# Patient Record
Sex: FEMALE | Race: WHITE | Employment: FULL TIME | URBAN - METROPOLITAN AREA
[De-identification: names, ages, dates, MRNs, and addresses within clinical notes are randomized per-mention and may not be internally consistent; named-entity substitution may affect disease eponyms.]

---

## 2019-11-01 ENCOUNTER — RECORDS - HEALTHEAST (OUTPATIENT)
Dept: LAB | Facility: CLINIC | Age: 54
End: 2019-11-01

## 2019-11-01 LAB
ALBUMIN SERPL-MCNC: 4.3 G/DL (ref 3.5–5)
ALP SERPL-CCNC: 115 U/L (ref 45–120)
ALT SERPL W P-5'-P-CCNC: 14 U/L (ref 0–45)
ANION GAP SERPL CALCULATED.3IONS-SCNC: 8 MMOL/L (ref 5–18)
AST SERPL W P-5'-P-CCNC: 17 U/L (ref 0–40)
BILIRUB SERPL-MCNC: 0.2 MG/DL (ref 0–1)
BUN SERPL-MCNC: 17 MG/DL (ref 8–22)
C REACTIVE PROTEIN LHE: 0.3 MG/DL (ref 0–0.8)
CALCIUM SERPL-MCNC: 9.9 MG/DL (ref 8.5–10.5)
CHLORIDE BLD-SCNC: 105 MMOL/L (ref 98–107)
CO2 SERPL-SCNC: 29 MMOL/L (ref 22–31)
CREAT SERPL-MCNC: 1.1 MG/DL (ref 0.6–1.1)
ERYTHROCYTE [DISTWIDTH] IN BLOOD BY AUTOMATED COUNT: 11.9 % (ref 11–14.5)
ERYTHROCYTE [SEDIMENTATION RATE] IN BLOOD BY WESTERGREN METHOD: 19 MM/HR (ref 0–20)
GFR SERPL CREATININE-BSD FRML MDRD: 52 ML/MIN/1.73M2
GLUCOSE BLD-MCNC: 92 MG/DL (ref 70–125)
HCT VFR BLD AUTO: 39.8 % (ref 35–47)
HGB BLD-MCNC: 13.1 G/DL (ref 12–16)
MCH RBC QN AUTO: 30.3 PG (ref 27–34)
MCHC RBC AUTO-ENTMCNC: 32.9 G/DL (ref 32–36)
MCV RBC AUTO: 92 FL (ref 80–100)
PLATELET # BLD AUTO: 324 THOU/UL (ref 140–440)
PMV BLD AUTO: 10.8 FL (ref 8.5–12.5)
POTASSIUM BLD-SCNC: 4.1 MMOL/L (ref 3.5–5)
PROT SERPL-MCNC: 7.1 G/DL (ref 6–8)
RBC # BLD AUTO: 4.32 MILL/UL (ref 3.8–5.4)
RHEUMATOID FACT SERPL-ACNC: <15 IU/ML (ref 0–30)
SODIUM SERPL-SCNC: 142 MMOL/L (ref 136–145)
T4 FREE SERPL-MCNC: 0.9 NG/DL (ref 0.7–1.8)
TSH SERPL DL<=0.005 MIU/L-ACNC: 1.42 UIU/ML (ref 0.3–5)
VIT B12 SERPL-MCNC: 860 PG/ML (ref 213–816)
WBC: 7.2 THOU/UL (ref 4–11)

## 2019-11-03 LAB — HBA1C MFR BLD: 5.8 % (ref 4.2–6.1)

## 2019-11-04 LAB
25(OH)D3 SERPL-MCNC: 21.2 NG/ML (ref 30–80)
A PHAGOCYTOPH DNA BLD QL NAA+PROBE: NOT DETECTED
ANA SER QL: 0.2 U
E CHAFFEENSIS DNA BLD QL NAA+PROBE: NOT DETECTED
E EWINGII DNA SPEC QL NAA+PROBE: NOT DETECTED
EHRLICHIA DNA SPEC QL NAA+PROBE: NOT DETECTED

## 2019-11-08 LAB
B BURGDOR AB SER-IMP: ABNORMAL
LYME AB IGG BAND(S): ABNORMAL
LYME AB IGM BAND(S): ABNORMAL
LYME IGG BLOT: NEGATIVE
LYME IGM BLOT: POSITIVE

## 2020-04-19 ENCOUNTER — VIRTUAL VISIT (OUTPATIENT)
Dept: FAMILY MEDICINE | Facility: OTHER | Age: 55
End: 2020-04-19

## 2020-04-20 NOTE — PROGRESS NOTES
"Date: 2020 22:43:36  Clinician: Kaia Pham  Clinician NPI: [id number]  Patient: Marta Jimenez  Patient : 1965  Patient Address: [address] [place] [address]  Patient Phone: [phone]  Visit Protocol: URI  Patient Summary:  Marta is a 54 year old ( : 1965 ) female who initiated a Visit for COVID-19 (Coronavirus) evaluation and screening. When asked the question \"Please sign me up to receive news, health information and promotions from [employer].\", Marta responded \"No\".    Marta states her symptoms started suddenly 3-4 days ago. After her symptoms started, they improved and then got worse again.   Her symptoms consist of chills, a sore throat, a cough, malaise, myalgia, and a headache. She is experiencing mild difficulty breathing with activities but can speak normally in full sentences. Marta also feels feverish.   Symptom details     Cough: Marta coughs a few times an hour and her cough is more bothersome at night. Phlegm does not come into her throat when she coughs. She does not believe her cough is caused by post-nasal drip.     Sore throat: Marta reports having moderate throat pain (4-6 on a 10 point pain scale), does not have exudate on her tonsils, and can swallow liquids. She is not sure if the lymph nodes in her neck are enlarged. A rash has not appeared on the skin since the sore throat started.     Temperature: Her current temperature is 100.4 degrees Fahrenheit. Marta has had a temperature over 100 degrees Fahrenheit for 3-4 days.     Headache: She states the headache is severe (7-9 on a 10 point pain scale).      Marta denies having rhinitis, diarrhea, facial pain or pressure, nasal congestion, ear pain, vomiting, nausea, teeth pain, wheezing, anosmia, and ageusia. She also denies having a sinus infection within the past year and having recent facial or sinus surgery in the past 60 days.   Precipitating events  Within the past week, Marta " has not been exposed to someone with strep throat. She has not recently been exposed to someone with influenza. Marta has been in close contact with the following high risk individuals: adults 65 or older, people with asthma, heart disease or diabetes, pregnant women, and immunocompromised people.   Pertinent COVID-19 (Coronavirus) information  Marta has not traveled internationally or to the areas where COVID-19 (Coronavirus) is widespread, including cruise ship travel in the last 14 days before the start of her symptoms.   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   Marta has had a close contact with a laboratory-confirmed COVID-19 patient within 14 days of symptom onset. She also has had a close contact with a suspected COVID-19 patient within 14 days of symptom onset. She was exposed at her work. Additional information about contact with COVID-19 (Coronavirus) patient as reported by the patient (free text): April 2, confirmed positive patients in OR. Several PUI since then.   Pertinent medical history  Marta has taken an antibiotic medication in the past month. Antibiotic details as reported by the patient (free text): Doxycycline for Lyme tick bite   Marta does not get yeast infections when she takes antibiotics.   Marta needs a return to work/school note.   Weight: 150 lbs   Marta does not smoke or use smokeless tobacco.   Additional information as reported by the patient (free text): Hx of asthma, heart condition, hypertension   Weight: 150 lbs    MEDICATIONS: aspirin oral, Wixela Inhub inhalation, albuterol sulfate inhalation, doxycycline hyclate oral, olmesartan-hydrochlorothiazide oral, ALLERGIES:  erythromycin base, Penicillins, Cleocin  Clinician Response:  Dear Marta,      Dear Marta  Your symptoms show that you may have coronavirus (COVID-19). This illness can cause fever, cough and trouble breathing. Many people get a mild case and get better on their own. Some people can get very sick.  Will I be tested for COVID-19?  Because the virus is spreading, we are no longer testing most patients. You may request testing if:   You are very ill. For example, you're on chemotherapy, dialysis or home hospice care. (Contact your specialty clinic or program.)   You live in a nursing home or other long-term care facility. (Talk to your nurse manager or medical director.)   You're a health care worker. (Contact your employee health office.)   How can I protect others?  Without a test, we can't know for sure that you have COVID-19. For safety, it's very important to follow these rules.  First, stay home and away from others (self-isolate) until:   You've had no fever---and no medicine that reduces fever---for 3 full days (72 hours). And...    Your other symptoms have gotten better. For example, your cough or breathing has improved. And...   At least 7 days have passed since your symptoms started.   During this time:   Don't go to work, school or anywhere else.    Stay away from others in your home. No hugging, kissing or shaking hands.   Don't let anyone visit.   Cover your mouth and nose with a mask, tissue or wash cloth to avoid spreading germs.   Wash your hands and face often. Use soap and water.   How can I take care of myself?   1.Take Tylenol (acetaminophen) for fever or pain. If you have liver or kidney problems, ask your family doctor if it's okay to take Tylenol.        Adults can take either:    650 mg (two 325 mg pills) every 4 to 6 hours, or...   1,000 mg (two 500 mg pills) every 8 hours as needed.    Note: Don't take more than 3,000 mg in one day.   For children, check the Tylenol bottle for the right  dose. The dose is based on the child's age or weight.   2.If you have other health problems (like cancer, heart failure, an organ transplant or severe kidney disease): Call your specialty clinic if you don't feel better in the next 2 days.       3.Know when to call 911: If your breathing is so bad that it keeps you from doing normal activities, call 911 or go to the emergency room. Tell them that you've been staying home and may have COVID-19.       4.Sign up for [employer]. We know it's scary to hear that you might have COVID-19. We want to track your symptoms to make sure you're okay over the next 2 weeks. Please look for an email from [employer]---this is a free, online program that we'll use to keep in touch. To sign up, follow the link in the email. Learn more at [email or web address].   Where can I get more information?  To learn more about COVID-19 and how to care for yourself at home, please visit the CDC website at https://www.cdc.gov/coronavirus/2019-ncov/about/steps-when-sick.html.  For more options for care at [facility], please visit our website at [email or web address]     Diagnosis: Cough  Diagnosis ICD: R05

## 2020-06-03 ENCOUNTER — OFFICE VISIT - HEALTHEAST (OUTPATIENT)
Dept: FAMILY MEDICINE | Facility: CLINIC | Age: 55
End: 2020-06-03

## 2020-06-03 ENCOUNTER — COMMUNICATION - HEALTHEAST (OUTPATIENT)
Dept: FAMILY MEDICINE | Facility: CLINIC | Age: 55
End: 2020-06-03

## 2020-06-03 DIAGNOSIS — M25.50 MULTIPLE JOINT PAIN: ICD-10-CM

## 2020-06-03 DIAGNOSIS — I20.89 SYNDROME X (CARDIAC) (H): ICD-10-CM

## 2020-06-03 DIAGNOSIS — R63.5 WEIGHT GAIN: ICD-10-CM

## 2020-06-03 DIAGNOSIS — R68.82 DECREASED LIBIDO: ICD-10-CM

## 2020-06-03 RX ORDER — OLMESARTAN MEDOXOMIL 40 MG/1
40 TABLET ORAL DAILY
Status: SHIPPED | COMMUNITY
Start: 2020-06-03

## 2020-06-03 RX ORDER — NITROGLYCERIN 0.4 MG/1
0.4 TABLET SUBLINGUAL
Status: SHIPPED | COMMUNITY
Start: 2019-05-01

## 2020-06-03 RX ORDER — ALBUTEROL SULFATE 90 UG/1
1-2 AEROSOL, METERED RESPIRATORY (INHALATION)
Status: SHIPPED | COMMUNITY
Start: 2020-06-03

## 2020-06-09 ENCOUNTER — AMBULATORY - HEALTHEAST (OUTPATIENT)
Dept: LAB | Facility: CLINIC | Age: 55
End: 2020-06-09

## 2020-06-09 DIAGNOSIS — M25.50 MULTIPLE JOINT PAIN: ICD-10-CM

## 2020-06-09 DIAGNOSIS — R63.5 WEIGHT GAIN: ICD-10-CM

## 2020-06-09 DIAGNOSIS — I20.89 SYNDROME X (CARDIAC) (H): ICD-10-CM

## 2020-06-09 DIAGNOSIS — R68.82 DECREASED LIBIDO: ICD-10-CM

## 2020-06-09 LAB
ALBUMIN SERPL-MCNC: 4 G/DL (ref 3.5–5)
ALP SERPL-CCNC: 113 U/L (ref 45–120)
ALT SERPL W P-5'-P-CCNC: 21 U/L (ref 0–45)
ANION GAP SERPL CALCULATED.3IONS-SCNC: 11 MMOL/L (ref 5–18)
AST SERPL W P-5'-P-CCNC: 27 U/L (ref 0–40)
BILIRUB SERPL-MCNC: 0.4 MG/DL (ref 0–1)
BUN SERPL-MCNC: 9 MG/DL (ref 8–22)
CALCIUM SERPL-MCNC: 9.4 MG/DL (ref 8.5–10.5)
CHLORIDE BLD-SCNC: 105 MMOL/L (ref 98–107)
CO2 SERPL-SCNC: 26 MMOL/L (ref 22–31)
CREAT SERPL-MCNC: 0.87 MG/DL (ref 0.6–1.1)
CRP SERPL HS-MCNC: 5.1 MG/L (ref 0–3)
ERYTHROCYTE [DISTWIDTH] IN BLOOD BY AUTOMATED COUNT: 11.9 % (ref 11–14.5)
ESTRADIOL SERPL-MCNC: <10 PG/ML
FASTING STATUS PATIENT QL REPORTED: NORMAL
FSH SERPL-ACNC: 70.2 MIU/ML
GFR SERPL CREATININE-BSD FRML MDRD: >60 ML/MIN/1.73M2
GGT SERPL-CCNC: 24 U/L (ref 0–50)
GLUCOSE BLD-MCNC: 96 MG/DL (ref 70–125)
HCT VFR BLD AUTO: 38.7 % (ref 35–47)
HGB BLD-MCNC: 13 G/DL (ref 12–16)
HOMOCYSTEINE PLASMA - HISTORICAL: 9 UMOL/L (ref 0–13)
MCH RBC QN AUTO: 30.5 PG (ref 27–34)
MCHC RBC AUTO-ENTMCNC: 33.6 G/DL (ref 32–36)
MCV RBC AUTO: 91 FL (ref 80–100)
PLATELET # BLD AUTO: 338 THOU/UL (ref 140–440)
PMV BLD AUTO: 7.6 FL (ref 7–10)
POTASSIUM BLD-SCNC: 4 MMOL/L (ref 3.5–5)
PROT SERPL-MCNC: 6.8 G/DL (ref 6–8)
RBC # BLD AUTO: 4.27 MILL/UL (ref 3.8–5.4)
SODIUM SERPL-SCNC: 142 MMOL/L (ref 136–145)
T3 SERPL-MCNC: 98 NG/DL (ref 45–175)
T3FREE SERPL-MCNC: 3.8 PG/ML (ref 1.9–3.9)
T4 FREE SERPL-MCNC: 0.9 NG/DL (ref 0.7–1.8)
THYROID PEROXIDASE ANTIBODIES - HISTORICAL: <3 IU/ML (ref 0–5.6)
TSH SERPL DL<=0.005 MIU/L-ACNC: 1.1 UIU/ML (ref 0.3–5)
VIT B12 SERPL-MCNC: 674 PG/ML (ref 213–816)
WBC: 6.1 THOU/UL (ref 4–11)

## 2020-06-10 LAB — PROGEST SERPL-MCNC: <0.1 NG/ML

## 2020-06-11 LAB
DHEA-S SERPL-MCNC: 116 UG/DL (ref 26–200)
LIPOPROTEIN (A) - HISTORICAL: 6 MG/DL
SHBG SERPL-SCNC: 27 NMOL/L (ref 30–135)
TESTOST FREE SERPL-MCNC: 0.36 NG/DL (ref 0.06–0.38)
TESTOST SERPL-MCNC: 18 NG/DL (ref 8–60)
THYROGLOB AB SERPL-ACNC: <0.9 IU/ML (ref 0–4)

## 2020-06-12 LAB
25(OH)D3 SERPL-MCNC: 23.6 NG/ML (ref 30–80)
GLIADIN IGA SER-ACNC: 1.5 U/ML
GLIADIN IGG SER-ACNC: 1.8 U/ML
IGA SERPL-MCNC: 120 MG/DL (ref 65–400)
MAGNESIUM,RBC - HISTORICAL: 5.8 MG/DL (ref 3.5–7.1)
SPECIMEN STATUS: NORMAL
T3REVERSE SERPL-MCNC: 12 NG/DL (ref 9–27)
TTG IGA SER-ACNC: 0.3 U/ML
TTG IGG SER-ACNC: <0.6 U/ML

## 2020-06-13 LAB
CHOLEST SERPL-MCNC: 212 MG/DL
HDL SERPL QN: 9.2 NM
HDL SERPL-SCNC: 34.7 UMOL/L
HDLC SERPL-MCNC: 61 MG/DL (ref 40–59)
HLD.LARGE SERPL-SCNC: 7.7 UMOL/L
LDL SERPL QN: 21.5 NM
LDL SERPL-SCNC: 1306 NMOL/L
LDL SMALL SERPL-SCNC: 327 NMOL/L
LDLC SERPL CALC-MCNC: 134 MG/DL
PATHOLOGY STUDY: ABNORMAL
TRIGL SERPL-MCNC: 87 MG/DL (ref 30–149)
VLDL LARGE SERPL-SCNC: 3.5 NMOL/L
VLDL SERPL QN: 47 NM

## 2020-06-29 ENCOUNTER — COMMUNICATION - HEALTHEAST (OUTPATIENT)
Dept: FAMILY MEDICINE | Facility: CLINIC | Age: 55
End: 2020-06-29

## 2020-07-14 ENCOUNTER — AMBULATORY - HEALTHEAST (OUTPATIENT)
Dept: FAMILY MEDICINE | Facility: CLINIC | Age: 55
End: 2020-07-14

## 2020-07-28 ENCOUNTER — AMBULATORY - HEALTHEAST (OUTPATIENT)
Dept: CARDIOLOGY | Facility: CLINIC | Age: 55
End: 2020-07-28

## 2020-07-30 ENCOUNTER — COMMUNICATION - HEALTHEAST (OUTPATIENT)
Dept: CARDIOLOGY | Facility: CLINIC | Age: 55
End: 2020-07-30

## 2020-07-31 ENCOUNTER — OFFICE VISIT - HEALTHEAST (OUTPATIENT)
Dept: CARDIOLOGY | Facility: CLINIC | Age: 55
End: 2020-07-31

## 2020-07-31 DIAGNOSIS — R06.09 DYSPNEA ON EXERTION: ICD-10-CM

## 2020-07-31 DIAGNOSIS — R07.9 CHEST PAIN: ICD-10-CM

## 2020-07-31 ASSESSMENT — MIFFLIN-ST. JEOR: SCORE: 1280.6

## 2020-08-03 LAB
ATRIAL RATE - MUSE: 87 BPM
DIASTOLIC BLOOD PRESSURE - MUSE: NORMAL
INTERPRETATION ECG - MUSE: NORMAL
P AXIS - MUSE: 66 DEGREES
PR INTERVAL - MUSE: 128 MS
QRS DURATION - MUSE: 80 MS
QT - MUSE: 370 MS
QTC - MUSE: 445 MS
R AXIS - MUSE: -7 DEGREES
SYSTOLIC BLOOD PRESSURE - MUSE: NORMAL
T AXIS - MUSE: 37 DEGREES
VENTRICULAR RATE- MUSE: 87 BPM

## 2020-08-04 ENCOUNTER — COMMUNICATION - HEALTHEAST (OUTPATIENT)
Dept: CARDIOLOGY | Facility: CLINIC | Age: 55
End: 2020-08-04

## 2020-08-20 ENCOUNTER — HOSPITAL ENCOUNTER (OUTPATIENT)
Dept: MRI IMAGING | Facility: HOSPITAL | Age: 55
Discharge: HOME | End: 2020-08-20
Attending: INTERNAL MEDICINE

## 2020-08-20 DIAGNOSIS — R06.09 DYSPNEA ON EXERTION: ICD-10-CM

## 2020-08-20 DIAGNOSIS — R07.9 CHEST PAIN: ICD-10-CM

## 2020-08-20 LAB
ATRIAL RATE - MUSE: 69 BPM
ATRIAL RATE - MUSE: 77 BPM
CREAT BLD-MCNC: 0.8 MG/DL (ref 0.6–1.1)
DIASTOLIC BLOOD PRESSURE - MUSE: NORMAL
DIASTOLIC BLOOD PRESSURE - MUSE: NORMAL
GFR SERPL CREATININE-BSD FRML MDRD: >60 ML/MIN/1.73M2
INTERPRETATION ECG - MUSE: NORMAL
INTERPRETATION ECG - MUSE: NORMAL
P AXIS - MUSE: 51 DEGREES
P AXIS - MUSE: 62 DEGREES
PR INTERVAL - MUSE: 130 MS
PR INTERVAL - MUSE: 131 MS
QRS DURATION - MUSE: 78 MS
QRS DURATION - MUSE: 80 MS
QT - MUSE: 376 MS
QT - MUSE: 410 MS
QTC - MUSE: 425 MS
QTC - MUSE: 439 MS
R AXIS - MUSE: -11 DEGREES
R AXIS - MUSE: -20 DEGREES
SYSTOLIC BLOOD PRESSURE - MUSE: NORMAL
SYSTOLIC BLOOD PRESSURE - MUSE: NORMAL
T AXIS - MUSE: 24 DEGREES
T AXIS - MUSE: 32 DEGREES
VENTRICULAR RATE- MUSE: 69 BPM
VENTRICULAR RATE- MUSE: 77 BPM

## 2020-08-20 ASSESSMENT — MIFFLIN-ST. JEOR: SCORE: 1280.6

## 2020-08-21 LAB
CCTA EJECTION FRACTION: 78 %
CCTA INTERVENTRICULAR SETPUM: 0.9 CM (ref 0.6–1.1)
CCTA LEFT INTERNAL DIMENSION IN SYSTOLE: 2.1 CM (ref 2.1–4)
CCTA LEFT VENTRICULAR INTERNAL DIMENSION IN DIASTOLE: 3.9 CM (ref 3.5–6)
CCTA LEFT VENTRICULAR MASS: 97.36 G
CCTA POSTERIOR WALL: 0.8 CM (ref 0.6–1.1)
MR CARDIAC LEFT VENTRIAL CARDIAC INDEX: 2.9 L/MIN/M2 (ref 1.75–3.8)
MR CARDIAC LEFT VENTRICAL CARDIAC OUTPUT: 5.1 L/MIN (ref 2.8–8.8)
MR CARDIAC LEFT VENTRICULAR DIASTOLIC VOLUME INDEX: 63.18 ML/M2 (ref 41–81)
MR CARDIAC LEFT VENTRICULAR MASS INDEX: 64.9 G/M2 (ref 63–95)
MR CARDIAC LEFT VENTRICULAR MASS: 113 G (ref 75–175)
MR CARDIAC LEFT VENTRICULAR STROKE VOLUME INDEX: 47.1 ML/M2 (ref 26–56)
MR CARDIAC LEFT VENTRICULAR SYSTOLIC VOLUME INDEX: 16.08 ML/M2 (ref 12–20)
MR EJECTION FRACTION: 74.55 %
MR HEIGHT: 1.61 M
MR LEFT VENTRICULAR DYSTOLIC VOLUMEN: 110 ML (ref 52–141)
MR LEFT VENTRICULAR STROKE VOLUMEN: 82 ML (ref 33–97)
MR LEFT VENTRICULAR SYSTOLIC VOLUME: 28 ML (ref 13–51)
MR WEIGHT: 69.9 KG

## 2020-09-03 ENCOUNTER — COMMUNICATION - HEALTHEAST (OUTPATIENT)
Dept: CARDIOLOGY | Facility: CLINIC | Age: 55
End: 2020-09-03

## 2020-09-04 ENCOUNTER — OFFICE VISIT - HEALTHEAST (OUTPATIENT)
Dept: CARDIOLOGY | Facility: CLINIC | Age: 55
End: 2020-09-04

## 2020-09-04 ENCOUNTER — AMBULATORY - HEALTHEAST (OUTPATIENT)
Dept: CARDIOLOGY | Facility: CLINIC | Age: 55
End: 2020-09-04

## 2020-09-04 DIAGNOSIS — R07.9 CHEST PAIN: ICD-10-CM

## 2020-09-04 DIAGNOSIS — I20.0 UNSTABLE ANGINA (H): ICD-10-CM

## 2020-09-04 DIAGNOSIS — Z11.59 ENCOUNTER FOR SCREENING FOR OTHER VIRAL DISEASES: ICD-10-CM

## 2020-09-04 RX ORDER — ISOSORBIDE MONONITRATE 60 MG/1
60 TABLET, EXTENDED RELEASE ORAL DAILY
Qty: 90 TABLET | Refills: 3 | Status: SHIPPED | OUTPATIENT
Start: 2020-09-04 | End: 2021-08-31

## 2020-09-04 ASSESSMENT — MIFFLIN-ST. JEOR: SCORE: 1253.38

## 2020-09-06 ENCOUNTER — AMBULATORY - HEALTHEAST (OUTPATIENT)
Dept: FAMILY MEDICINE | Facility: CLINIC | Age: 55
End: 2020-09-06

## 2020-09-06 DIAGNOSIS — Z11.59 ENCOUNTER FOR SCREENING FOR OTHER VIRAL DISEASES: ICD-10-CM

## 2020-09-08 ENCOUNTER — AMBULATORY - HEALTHEAST (OUTPATIENT)
Dept: CARDIOLOGY | Facility: CLINIC | Age: 55
End: 2020-09-08

## 2020-09-08 ENCOUNTER — SURGERY - HEALTHEAST (OUTPATIENT)
Dept: CARDIOLOGY | Facility: CLINIC | Age: 55
End: 2020-09-08

## 2020-09-08 ENCOUNTER — COMMUNICATION - HEALTHEAST (OUTPATIENT)
Dept: SCHEDULING | Facility: CLINIC | Age: 55
End: 2020-09-08

## 2020-09-08 DIAGNOSIS — I20.0 UNSTABLE ANGINA (H): ICD-10-CM

## 2020-09-09 ENCOUNTER — SURGERY - HEALTHEAST (OUTPATIENT)
Dept: CARDIOLOGY | Facility: CLINIC | Age: 55
End: 2020-09-09

## 2020-09-09 ASSESSMENT — MIFFLIN-ST. JEOR: SCORE: 1259.97

## 2020-09-18 ENCOUNTER — AMBULATORY - HEALTHEAST (OUTPATIENT)
Dept: CARDIOLOGY | Facility: CLINIC | Age: 55
End: 2020-09-18

## 2020-09-18 ENCOUNTER — RECORDS - HEALTHEAST (OUTPATIENT)
Dept: ADMINISTRATIVE | Facility: OTHER | Age: 55
End: 2020-09-18

## 2020-09-21 ENCOUNTER — OFFICE VISIT - HEALTHEAST (OUTPATIENT)
Dept: CARDIOLOGY | Facility: CLINIC | Age: 55
End: 2020-09-21

## 2020-09-21 DIAGNOSIS — E78.5 DYSLIPIDEMIA: ICD-10-CM

## 2020-09-21 RX ORDER — ROSUVASTATIN CALCIUM 5 MG/1
2.5 TABLET, COATED ORAL AT BEDTIME
Qty: 90 TABLET | Refills: 3 | Status: SHIPPED | OUTPATIENT
Start: 2020-09-21 | End: 2021-09-28

## 2020-09-28 DIAGNOSIS — Z11.59 SCREENING FOR VIRAL DISEASE: ICD-10-CM

## 2020-10-09 ENCOUNTER — RECORDS - HEALTHEAST (OUTPATIENT)
Dept: LAB | Facility: CLINIC | Age: 55
End: 2020-10-09

## 2020-10-09 LAB
ALBUMIN SERPL-MCNC: 4 G/DL (ref 3.5–5)
ALBUMIN SERPL-MCNC: 4 G/DL (ref 3.5–5)
ALP SERPL-CCNC: 128 U/L (ref 45–120)
ALP SERPL-CCNC: 128 U/L (ref 45–120)
ALT SERPL W P-5'-P-CCNC: 13 U/L (ref 0–45)
ALT SERPL W P-5'-P-CCNC: 13 U/L (ref 0–45)
ANION GAP SERPL CALCULATED.3IONS-SCNC: 8 MMOL/L (ref 5–18)
AST SERPL W P-5'-P-CCNC: 17 U/L (ref 0–40)
AST SERPL W P-5'-P-CCNC: 17 U/L (ref 0–40)
BASOPHILS # BLD AUTO: 0 THOU/UL (ref 0–0.2)
BASOPHILS NFR BLD AUTO: 1 % (ref 0–2)
BILIRUB DIRECT SERPL-MCNC: 0.1 MG/DL
BILIRUB SERPL-MCNC: 0.4 MG/DL (ref 0–1)
BILIRUB SERPL-MCNC: 0.4 MG/DL (ref 0–1)
BUN SERPL-MCNC: 13 MG/DL (ref 8–22)
C REACTIVE PROTEIN LHE: 0.4 MG/DL (ref 0–0.8)
CALCIUM SERPL-MCNC: 9.4 MG/DL (ref 8.5–10.5)
CHLORIDE BLD-SCNC: 103 MMOL/L (ref 98–107)
CO2 SERPL-SCNC: 27 MMOL/L (ref 22–31)
CREAT SERPL-MCNC: 0.83 MG/DL (ref 0.6–1.1)
EOSINOPHIL # BLD AUTO: 0.2 THOU/UL (ref 0–0.4)
EOSINOPHIL NFR BLD AUTO: 3 % (ref 0–6)
ERYTHROCYTE [DISTWIDTH] IN BLOOD BY AUTOMATED COUNT: 12.2 % (ref 11–14.5)
ERYTHROCYTE [SEDIMENTATION RATE] IN BLOOD BY WESTERGREN METHOD: 14 MM/HR (ref 0–20)
GFR SERPL CREATININE-BSD FRML MDRD: >60 ML/MIN/1.73M2
GLUCOSE BLD-MCNC: 90 MG/DL (ref 70–125)
HCT VFR BLD AUTO: 41 % (ref 35–47)
HGB BLD-MCNC: 13.2 G/DL (ref 12–16)
IMM GRANULOCYTES # BLD: 0 THOU/UL
IMM GRANULOCYTES NFR BLD: 0 %
LYMPHOCYTES # BLD AUTO: 2 THOU/UL (ref 0.8–4.4)
LYMPHOCYTES NFR BLD AUTO: 38 % (ref 20–40)
MCH RBC QN AUTO: 29.7 PG (ref 27–34)
MCHC RBC AUTO-ENTMCNC: 32.2 G/DL (ref 32–36)
MCV RBC AUTO: 92 FL (ref 80–100)
MONOCYTES # BLD AUTO: 0.5 THOU/UL (ref 0–0.9)
MONOCYTES NFR BLD AUTO: 9 % (ref 2–10)
NEUTROPHILS # BLD AUTO: 2.6 THOU/UL (ref 2–7.7)
NEUTROPHILS NFR BLD AUTO: 49 % (ref 50–70)
PLATELET # BLD AUTO: 311 THOU/UL (ref 140–440)
PMV BLD AUTO: 11.2 FL (ref 8.5–12.5)
POTASSIUM BLD-SCNC: 4.9 MMOL/L (ref 3.5–5)
PROT SERPL-MCNC: 6.3 G/DL (ref 6–8)
PROT SERPL-MCNC: 6.3 G/DL (ref 6–8)
RBC # BLD AUTO: 4.44 MILL/UL (ref 3.8–5.4)
SODIUM SERPL-SCNC: 138 MMOL/L (ref 136–145)
WBC: 5.3 THOU/UL (ref 4–11)

## 2020-11-27 ENCOUNTER — RECORDS - HEALTHEAST (OUTPATIENT)
Dept: LAB | Facility: CLINIC | Age: 55
End: 2020-11-27

## 2020-11-27 LAB
ALBUMIN SERPL-MCNC: 4.2 G/DL (ref 3.5–5)
ALP SERPL-CCNC: 128 U/L (ref 45–120)
ALT SERPL W P-5'-P-CCNC: 19 U/L (ref 0–45)
ANION GAP SERPL CALCULATED.3IONS-SCNC: 10 MMOL/L (ref 5–18)
AST SERPL W P-5'-P-CCNC: 25 U/L (ref 0–40)
BILIRUB SERPL-MCNC: 0.1 MG/DL (ref 0–1)
BUN SERPL-MCNC: 9 MG/DL (ref 8–22)
C REACTIVE PROTEIN LHE: 0.6 MG/DL (ref 0–0.8)
CALCIUM SERPL-MCNC: 9.7 MG/DL (ref 8.5–10.5)
CHLORIDE BLD-SCNC: 106 MMOL/L (ref 98–107)
CO2 SERPL-SCNC: 29 MMOL/L (ref 22–31)
CREAT SERPL-MCNC: 0.82 MG/DL (ref 0.6–1.1)
ERYTHROCYTE [DISTWIDTH] IN BLOOD BY AUTOMATED COUNT: 13.1 % (ref 11–14.5)
ERYTHROCYTE [SEDIMENTATION RATE] IN BLOOD BY WESTERGREN METHOD: 14 MM/HR (ref 0–20)
GFR SERPL CREATININE-BSD FRML MDRD: >60 ML/MIN/1.73M2
GLUCOSE BLD-MCNC: 91 MG/DL (ref 70–125)
HCT VFR BLD AUTO: 40.7 % (ref 35–47)
HGB BLD-MCNC: 13 G/DL (ref 12–16)
MCH RBC QN AUTO: 29.7 PG (ref 27–34)
MCHC RBC AUTO-ENTMCNC: 31.9 G/DL (ref 32–36)
MCV RBC AUTO: 93 FL (ref 80–100)
PLATELET # BLD AUTO: 359 THOU/UL (ref 140–440)
PMV BLD AUTO: 10.7 FL (ref 8.5–12.5)
POTASSIUM BLD-SCNC: 4.8 MMOL/L (ref 3.5–5)
PROT SERPL-MCNC: 7.1 G/DL (ref 6–8)
RBC # BLD AUTO: 4.37 MILL/UL (ref 3.8–5.4)
SODIUM SERPL-SCNC: 145 MMOL/L (ref 136–145)
WBC: 7.7 THOU/UL (ref 4–11)

## 2021-06-04 VITALS
WEIGHT: 154 LBS | SYSTOLIC BLOOD PRESSURE: 120 MMHG | DIASTOLIC BLOOD PRESSURE: 68 MMHG | HEART RATE: 93 BPM | HEIGHT: 64 IN | BODY MASS INDEX: 26.29 KG/M2

## 2021-06-04 VITALS
RESPIRATION RATE: 12 BRPM | HEART RATE: 84 BPM | WEIGHT: 148 LBS | HEIGHT: 64 IN | BODY MASS INDEX: 25.27 KG/M2 | SYSTOLIC BLOOD PRESSURE: 120 MMHG | DIASTOLIC BLOOD PRESSURE: 78 MMHG

## 2021-06-04 VITALS
OXYGEN SATURATION: 98 % | WEIGHT: 148 LBS | DIASTOLIC BLOOD PRESSURE: 78 MMHG | HEART RATE: 86 BPM | SYSTOLIC BLOOD PRESSURE: 118 MMHG | BODY MASS INDEX: 26.22 KG/M2

## 2021-06-04 VITALS — HEIGHT: 64 IN | BODY MASS INDEX: 26.29 KG/M2 | WEIGHT: 154 LBS

## 2021-06-04 VITALS — WEIGHT: 151.2 LBS | HEIGHT: 63 IN | BODY MASS INDEX: 26.79 KG/M2

## 2021-06-08 NOTE — PATIENT INSTRUCTIONS - HE
Functional Nutrition Plan      Functional Nutrition Food Plan :  Elimination Diet      Food Plan Modifiers: Gluten Free    Lifestyle Plan          Sleep:  No concerns        Exercise:  Yoga / walking        Restoration:  Breathing Techniques and yoga,  [email or web address]    Supplements/Medications Plan    Supplement/Medication Dosage/Instructions   TBD                                         Additional Comments:  Make appointment for fasting labs    Prescribed by: Eden Martinez MD  Date: 6/3/2020  Follow-up Appointment:  1) Dietician 2)
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
## 2021-06-08 NOTE — TELEPHONE ENCOUNTER
Reason for call:  Patient is calling back stating The clinic left a message to return their call.   Information relayed to patient:  No message was found to relay.  Patient has an appointment today and this maybe regarding this.   Patient has additional questions:  Yes  If YES, what are your questions/concerns:  Why was I called? Please follow up with patient.  Okay to leave a detailed message?:  Yes

## 2021-06-09 NOTE — PROGRESS NOTES
"Functional Medicine Program- Holistic Lifestyle Coaching Initial Visit    Marta Jimenez was seen for Holistic Lifestyle Coaching.    Lifestyle choices the patient would like to incorporate:   - Continue to work on \"slowing down\"  - Yoga   - Increase strength and conditioning (when appropriate)     Main challenges / obstacles:   - Pain and inflammation  - Heart dysfunction   - Torn labrum - L side      Main motivators:    - Aging and preventing illness  - Wanting to be/feel strong and fit (be tenacious)   - Experience life fully     Recommendations for the patient:    - Possibly add in some animal protein when exercise intensity/duration increases to help with recovery  - Patient was interested in doing the Bodpod assessment (body comp)     Patient Goals:    1) Daily movement: Walking, Biking or Rollerblading   2) Practice Hatha or Gentle Yoga 2x/week for 30 minutes (youtube)  3) Body Scan meditation for 15 to 20 minutes 3x/week before bed  4) Future: Add Bodyweight strength / TRX workouts      Deon Hardy, Health      "

## 2021-06-10 NOTE — TELEPHONE ENCOUNTER
Return call to patient - informed her of Dr. Vanegas's response/recommendations - patient verbalized understanding and stated she did take dose this am after pre-medicating with Tylenol and will see how she feels before stopping Rx - confirmed 8-20-20 MR cardiac stress - patient agreed to call back with any further concerns during interim.  mg

## 2021-06-10 NOTE — TELEPHONE ENCOUNTER
Left message for patient requesting call back to complete Wellness Screen prior to  clinic appt tomorrow.    Wellness Screening Tool  Symptom Screening:  Do you have one of the following NEW symptoms:    Fever (subjective or >100.0)?  No    A new cough?  No    Shortness of breath?  No     Chills? No     New loss of taste or smell? No     Generalized body aches? No     New persistent headache? No     New sore throat? No     Nausea, vomiting, or diarrhea?  No    Within the past 3 weeks, have you been exposed to someone with a known positive illness below:    COVID-19 (known or suspected)?  No    Chicken pox?  No    Mealses?  No    Pertussis?  No    Patient notified of visitor policy- They may have one person accompany them to their appointment, but they will need to wear a mask and will be screened upon arrival for symptoms: Yes - wife will accompany pt to visit.  mg  Pt informed to wear a mask: Yes  Pt notified if they develop any symptoms listed above, prior to their appointment, they are to call the clinic directly at [phone] for further instructions.  Yes  Patient's appointment status: Patient will be seen in clinic as scheduled on Fri. 7-31-20 @ 1030 in [facility] with Dr. Vanegas.  mg

## 2021-06-10 NOTE — TELEPHONE ENCOUNTER
Return call to patient who stated she started Imdur 30mg Saturday am and developed sx of dizziness, headache and feeling off balance within 30-45min - patient cut tab in half and sx persisted - yesterday patient experienced some CP and scapular pain.    Patient reports that her BP readings have been stable and had expected the headache, which she tolerates, but verbalized concerns about feeling lightheaded and off balance since she rides her bike to work - patient stated she has been staying hydrated and has hx of vertigo - confirmed MR cardiac stress sched for 8-20-20 - f/u sched on 9-4-20.    Informed patient that she should avoid riding bike until sx improve/resolve and pre medicating with Tylenol may held with HA as she becomes acclimated to new medication - instructed patient to avoid cutting tabs in half unless directed by physician and reassured her that update would be forwarded to Dr. Vanegas - understanding verbalized.    Please review patient update - any new orders at this time?  mg

## 2021-06-10 NOTE — TELEPHONE ENCOUNTER
She can stop taking imdur and continue on the SL NTG that she takes as needed.  Will make more recommendations once we have the results of the cardiac MR.

## 2021-06-10 NOTE — TELEPHONE ENCOUNTER
----- Message from Kimberly Connell sent at 8/4/2020  8:22 AM CDT -----  General phone call:    Caller: Patient  Primary cardiologist: Dr. Vanegas  Detailed reason for call: Patient started isosorbide on Saturday and she feels she is having some side effects to this medication. Feeling a whooshing, lightheaded, continuing headache.  Orthostatic tension when she stands up .  Is this normal adjustment?    New or active symptoms? Side Effects.    Best phone number: [phone]  Best time to contact: Anytime  Ok to leave a detailed message?  Yes  Device? no    Additional Info:

## 2021-06-11 NOTE — PROGRESS NOTES
Marta Jimenez  [address] [place] [address]  [phone] (home) [phone] (work)    Procedure cardiologist:  Juan Manuel Thompson MD  PCP:  Lorin Traore MD  H&P completed by:  Mag Vanegas MD 9/4/2020  Admit date 9/9/2020  Arrival time:  0930  Anticoagulation: None  CPAP: No  Previous PCI: none  Bypass Grafts: No  Renal Issues: No  Diabetic?: No  Device?: No  Type:  N/a      Angiogram Teaching    Reason for Visit:  Patient and wife Debbie, seen for pre-procedure education in preparation for: Coronary Angiogram with Possible Percutaneous Coronary Intervention scheduled on 9/9/2020    Procedure Prep:  Primary Cardiologist note dated: Mag Vanegas MD 9/4/2020  EKG results obtained, dated: 8/20/2020  Hemogram results obtained: 6/9/2020  Basic Metabolic Panel results obtained: 6/9/2020  Lipid Profile results obtained: 4/30/2019    Patient Education  Explained indications for diagnostic evaluation, including chest pain, frequency of nitroglycerin use.   Explained indications therapeutic interventions, balloon angioplasty and/or stenting.   Patient states understanding of procedure and risks and agrees to proceed.    Pre-procedure instructions  Patient instructed to be NPO after midnight.  Patient instructed to shower the evening before or the morning of the procedure.  Patient instructed to arrange for transportation home following procedure from a responsible family member of friend. No driving for at least 24 hours.  Patient instructed to have a responsible adult with them for 24 hours post-procedure.  Post-procedure follow up process.  Conscious sedation discussed.    Pre-procedure medication instructions  Patient instructed on antiplatelet medication.  Continue medications as scheduled, with a small amount of water on the day of the procedure unless indicated.  Patient instructed to take 324 mg of Aspirin am of procedure: Yes  Other medication: instructed to her regular morning medications and  (4) 81mg aspirin the a.m. of the procedure.Not to   Take any vitamins or supplements the day of procedure.    *PATIENTS RECORDS AVAILABLE IN [facility] UNLESS OTHERWISE INDICATED*      Patient Active Problem List   Diagnosis     Syndrome X (cardiac) (H)     Essential hypertension     Migraine without status migrainosus, not intractable     Unstable angina (H)       Current Outpatient Medications   Medication Sig Dispense Refill     albuterol (PROAIR HFA;PROVENTIL HFA;VENTOLIN HFA) 90 mcg/actuation inhaler Inhale 1-2 puffs.       arginine HCl, L-arginine, 1,000 mg Tab Take 3,000 mg by mouth.       aspirin 81 MG EC tablet Take 81 mg by mouth.       fluticasone propion-salmeteroL (ADVAIR) 250-50 mcg/dose DISKUS Inhale 1 puff.       isosorbide mononitrate (IMDUR) 60 MG 24 hr tablet Take 1 tablet (60 mg total) by mouth daily. 90 tablet 3     nitroglycerin (NITROSTAT) 0.4 MG SL tablet Place 0.4 mg under the tongue.       olmesartan (BENICAR) 40 MG tablet Take 40 mg by mouth.       No current facility-administered medications for this visit.        Allergies   Allergen Reactions     Aspirin Wheezing     Clindamycin Anaphylaxis     Penicillins Shortness Of Breath     Erythromycin Nausea And Vomiting     Iodine Rash     Marta and her wife Debbie were offered education and preparation instructions today for her Coronary Angiogram with Possible PCI, including preparation, medication instructions, possible outcomes and expectations of the day. They denied questions at this time. Written materials were given for their reference and COVID 19 testing/screening was arranged for Sunday prior to the procedure. They understand that until the testing is completed, they will not know if she will need to remain overnite, as this is reserved for those needing and intervention or additional monitoring.     ZAY Garzon

## 2021-06-11 NOTE — TELEPHONE ENCOUNTER
Wellness Screening Tool  Symptom Screening:  Do you have one of the following NEW symptoms:    Fever (subjective or >100.0)?  No    A new cough?  No    Shortness of breath?  No     Chills? No     New loss of taste or smell? No     Generalized body aches? No     New persistent headache? No     New sore throat? No     Nausea, vomiting, or diarrhea?  No    Within the past 2 weeks, have you been exposed to someone with a known positive illness below:    COVID-19 (known or suspected)?  No    Chicken pox?  No    Mealses?  No    Pertussis?  No    Patient notified of visitor policy- They may have one person accompany them to their appointment, but they will need to wear a mask and will be screened upon arrival for symptoms: Yes - spouse will accompany pt to visit.  mg  Pt informed to wear a mask: Yes  Pt notified if they develop any symptoms listed above, prior to their appointment, they are to call the clinic directly at [phone] for further instructions.  Yes  Patient's appointment status: Patient will be seen in clinic as scheduled on Fri 9-4-20 @ 1530 in [facility] with Dr. Vanegas.  mg

## 2021-06-16 PROBLEM — I20.89: Status: ACTIVE | Noted: 2019-05-09

## 2021-06-16 PROBLEM — I10 ESSENTIAL HYPERTENSION: Status: ACTIVE | Noted: 2019-05-09

## 2021-06-16 PROBLEM — I20.0 UNSTABLE ANGINA (H): Status: ACTIVE | Noted: 2020-09-04

## 2021-06-16 PROBLEM — G43.909 MIGRAINE WITHOUT STATUS MIGRAINOSUS, NOT INTRACTABLE: Status: ACTIVE | Noted: 2019-05-09

## 2021-06-18 NOTE — PATIENT INSTRUCTIONS - HE
Patient Instructions by Mag Vanegas MD at 7/31/2020 10:30 AM     Author: Mag Vanegas MD Service: -- Author Type: Physician    Filed: 7/31/2020 11:23 AM Encounter Date: 7/31/2020 Status: Signed    : Mag Vanegas MD (Physician)       Ms. Marta Jimenez,     It was a pleasure to see you in the office today. My recommendations for you include:   1. Cardiac MR stress  2. Start imdur 30 mg daily  Follow up in 4 weeks     Please do not hesitate to call the [facility] clinic with any questions or concerns at [phone].    Sincerely,

## 2021-06-18 NOTE — PATIENT INSTRUCTIONS - HE
Patient Instructions by Mag Vanegas MD at 9/21/2020 12:50 PM     Author: Mag Vanegas MD Service: -- Author Type: Physician    Filed: 9/21/2020  1:14 PM Encounter Date: 9/21/2020 Status: Signed    : Mag Vanegas MD (Physician)       Ms. Marta Jimenez,     It was a pleasure to see you in the office today. My recommendations for you include:   1. Consider changing olmasartan to norvasc   2. Follow up with GI     Please do not hesitate to call the [facility] clinic with any questions or concerns at [phone].    Sincerely,

## 2021-06-20 ENCOUNTER — HEALTH MAINTENANCE LETTER (OUTPATIENT)
Age: 56
End: 2021-06-20

## 2021-06-20 NOTE — LETTER
Letter by Eden Martinez MD at      Author: Eden Martinez MD Service: -- Author Type: --    Filed:  Encounter Date: 6/3/2020 Status: (Other)             Functional Medicine Prescription    and Lifestyle Plan       Name: Marta Jimenez    YOB: 1965    Functional Nutrition Plan      Functional Nutrition Food Plan :  Elimination Diet      Food Plan Modifiers: Gluten Free    Lifestyle Plan          Sleep:  No concerns        Exercise:  Yoga / walking        Restoration:  Breathing Techniques and yoga    Supplements/Medications Plan    Supplement/Medication Dosage/Instructions   TBD                                         Additional Comments:  Make appointment for fasting labs    Prescribed by: Eden Martinez MD  Date: 6/3/2020  Follow-up Appointment:  1) Dietician 2)

## 2021-06-29 NOTE — PROGRESS NOTES
Progress Notes by Mag Vanegas MD at 7/31/2020 10:30 AM     Author: Mag Vanegas MD Service: -- Author Type: Physician    Filed: 8/4/2020  8:54 AM Encounter Date: 7/31/2020 Status: Signed    : Mag Vanegas MD (Physician)           Thank you, Dr. Traore, for asking us to see Marta Jimenez at the [facility].      Assessment/Recommendations   Assessment:    1.  Chest discomfort: Previously diagnosed with microvascular heart disease.  Now with worsening symptoms that seems to been triggered by cold and infection in April.  Will recommend cardiac MRI stress test both to assess for ischemia as well to look for myocardial injury which may have been related to the COVID infection.  2.  Microvascular heart disease      Plan:  1.  Cardiac MRI stress test  2.  She is responsive to nitroglycerin so we will start her on Imdur 30 mg daily  3.  Continue on olmesartan  Follow-up in a month after MRI complete     History of Present Illness    Ms. Marta Jimenez is a 54 y.o. female with history of diagnosed microvascular disease who is here to see me for initial consultation.  She was previously seen by Dr. Starr a cardiologist in [place] and evaluated there in 2016.  She was experiencing extreme headaches and also at times shortness of breath.  She underwent a stress test and this was reportedly abnormal.  Angiogram showed microvascular disease.  She was started on L-arginine, aspirin 81 and olmesartan.  Since then she has been doing well with on and off symptoms.  She has chest pain a couple times a year.  She was seen by Dr. Martinez at [facility] last year.  She underwent a stress echo and had chest pain but otherwise stress testing was negative for inducible ischemia.  She did contract COVID and her partner did as well this spring in April.  Since she had cold and recovered she has experienced worsening symptoms.  She takes nitroglycerin 3 times a month.  Her  "chest pain feels like a squeezing sensation or a \"donkey kick\" in the chest that radiates into the neck and jaw and shoulder blades as well as with shortness of breath.  Otherwise she has been previously an active person.       Physical Examination Review of Systems   Vitals:    20 1036   BP: 120/68   Pulse: 93     Body mass index is 26.85 kg/m .  Wt Readings from Last 3 Encounters:   20 154 lb (69.9 kg)       General Appearance:   alert, no apparent distress   HEENT:  no scleral icterus; the mucous membranes are pink and moist                                  Neck: No jvd   Chest: the spine is straight and the chest is symmetric   Lungs:   respirations unlabored; the lungs are clear to auscultation   Cardiovascular:   regular rhythm with normal first and second heart sounds and no murmurs or gallops   Abdomen:  no organomegaly, masses, bruits, or tenderness; bowel sounds are present   Extremities: no edema   Skin: no xanthelasma    General: WNL  Eyes: WNL  Ears/Nose/Throat: WNL  Lungs: WNL  Heart: Chest Pain, Arm Pain, Shortness of Breath with activity, Irregular Heartbeat  Stomach: WNL  Bladder: WNL  Muscle/Joints: Joint Pain, Muscle Weakness, Muscle Pain  Skin: WNL  Nervous System: WNL  Mental Health: WNL     Blood: WNL     Medical History  Surgical History Family History Social History   Microvascular heart disease No past surgical history on file.    Father had myocardial infarction age 39 and 41 and underwent coronary bypass grafting.  Paternal uncle had myocardial infarction at age 52 and is .. Social History     Socioeconomic History   ? Marital status:      Spouse name: Not on file   ? Number of children: Not on file   ? Years of education: Not on file   ? Highest education level: Not on file   Occupational History   ? Not on file   Social Needs   ? Financial resource strain: Not on file   ? Food insecurity     Worry: Not on file     Inability: Not on file   ? Transportation needs "     Medical: Not on file     Non-medical: Not on file   Tobacco Use   ? Smoking status: Never Smoker   Substance and Sexual Activity   ? Alcohol use: Not on file   ? Drug use: Not on file   ? Sexual activity: Not on file   Lifestyle   ? Physical activity     Days per week: Not on file     Minutes per session: Not on file   ? Stress: Not on file   Relationships   ? Social connections     Talks on phone: Not on file     Gets together: Not on file     Attends Jew service: Not on file     Active member of club or organization: Not on file     Attends meetings of clubs or organizations: Not on file     Relationship status: Not on file   ? Intimate partner violence     Fear of current or ex partner: Not on file     Emotionally abused: Not on file     Physically abused: Not on file     Forced sexual activity: Not on file   Other Topics Concern   ? Not on file   Social History Narrative   ? Not on file          Medications  Allergies   Current Outpatient Medications   Medication Sig Dispense Refill   ? albuterol (PROAIR HFA;PROVENTIL HFA;VENTOLIN HFA) 90 mcg/actuation inhaler Inhale 1-2 puffs.     ? arginine HCl, L-arginine, 1,000 mg Tab Take 3,000 mg by mouth.     ? aspirin 81 MG EC tablet Take 81 mg by mouth.     ? fluticasone propion-salmeteroL (ADVAIR) 250-50 mcg/dose DISKUS Inhale 1 puff.     ? nitroglycerin (NITROSTAT) 0.4 MG SL tablet Place 0.4 mg under the tongue.     ? olmesartan (BENICAR) 40 MG tablet Take 40 mg by mouth.     ? isosorbide mononitrate (IMDUR) 30 MG 24 hr tablet Take 1 tablet (30 mg total) by mouth daily. 30 tablet 5     No current facility-administered medications for this visit.       Allergies   Allergen Reactions   ? Aspirin Wheezing   ? Clindamycin Anaphylaxis   ? Penicillins Shortness Of Breath   ? Erythromycin Nausea And Vomiting   ? Iodine Rash         Lab Results    Chemistry/lipid CBC Cardiac Enzymes/BNP/TSH/INR   Lab Results   Component Value Date    CREATININE 0.87 06/09/2020     BUN 9 06/09/2020    K 4.0 06/09/2020     06/09/2020     06/09/2020    CO2 26 06/09/2020    Lab Results   Component Value Date    WBC 6.1 06/09/2020    HGB 13.0 06/09/2020    HCT 38.7 06/09/2020    MCV 91 06/09/2020     06/09/2020    Lab Results   Component Value Date    TSH 1.10 06/09/2020

## 2021-06-29 NOTE — PROGRESS NOTES
Progress Notes by Mag Vanegas MD at 9/21/2020 12:50 PM     Author: Mag Vanegas MD Service: -- Author Type: Physician    Filed: 9/21/2020  1:27 PM Encounter Date: 9/21/2020 Status: Signed    : Mag Vanegas MD (Physician)           Thank you, Dr. Traore, for asking us to see Marta Jimenez at the [facility].      Assessment/Recommendations   Assessment:    1.   Coronary artery disease: 60% stenotic lesion in very small ramus   2. Microvascular heart disease diagnosed by an outside hospital        Plan:  1.   We discussed today changing olmesartan to Norvasc 2.5 mg daily to see if this would help with her symptoms better and she would like to hold off on this change for now.  2.   Continue imdur to 60 mg daily  3.   Add Crestor 2.5 mg daily and repeat fasting lipids in 3 months  4.  Follow-up with GI for potential GI cause of her symptoms       History of Present Illness    Ms. Marta Jimenez is a 55 y.o. female with reported history of microvascular disease diagnosed in [place] in 2016 maintained on L-arginine, olmesartan and aspirin.  She suffered from COVID back in April and since then has noted worsening symptoms of chest pressure mainly at rest relieved with nitroglycerin taking 2-3 times a week.  She was started on Imdur 30 mg which not help with her symptoms.  Stress MRI was normal.  She has become more and more anxious due to the chest pain and when I saw her in follow-up a few weeks ago we decided to proceed with coronary angiography to exclude any new disease.  Coronary angiogram demonstrated only a 60% stenotic lesion in a very small ramus.  Otherwise unremarkable.  Patient today reports that she has noted any further chest pain since this.  Her Imdur was increased to 60 mg daily.       Physical Examination Review of Systems   Vitals:    09/21/20 1258   BP: 118/78   Pulse: 86   SpO2: 98%     Body mass index is 26.22 kg/m .  Wt Readings from  Last 3 Encounters:   09/21/20 148 lb (67.1 kg)   09/09/20 151 lb 3.2 oz (68.6 kg)   09/04/20 148 lb (67.1 kg)       General Appearance:   alert, no apparent distress   HEENT:  no scleral icterus; the mucous membranes are pink and moist                                  Neck: No jvd   Chest: the spine is straight and the chest is symmetric   Lungs:   respirations unlabored; the lungs are clear to auscultation   Cardiovascular:   regular rhythm        Extremities: no edema   Skin: no xanthelasma    General: WNL  Eyes: WNL  Ears/Nose/Throat: WNL  Lungs: WNL  Heart: WNL  Stomach: WNL  Bladder: WNL  Muscle/Joints: WNL  Skin: WNL  Nervous System: WNL  Mental Health: WNL     Blood: WNL     Medical History  Surgical History Family History Social History   Past Medical History:   Diagnosis Date   ? Asthma    ? Family history of myocardial infarction    ? Hypertension     Past Surgical History:   Procedure Laterality Date   ? CORONARY ANGIOPLASTY  2016   ? CV CORONARY ANGIOGRAM N/A 9/9/2020    Procedure: Coronary Angiogram;  Surgeon: Juan Manuel Thompson MD;  Location: [facility];  Service: Cardiology   ? HIP ARTHROSCOPY W/ LABRAL REPAIR  2016    left   ? HYSTERECTOMY  2006   ? MYOMECTOMY  1993   ? TONSILLECTOMY AND ADENOIDECTOMY  1972    Family History   Problem Relation Age of Onset   ? Heart attack Father     Social History     Socioeconomic History   ? Marital status:      Spouse name: Not on file   ? Number of children: Not on file   ? Years of education: Not on file   ? Highest education level: Not on file   Occupational History   ? Not on file   Social Needs   ? Financial resource strain: Not on file   ? Food insecurity     Worry: Not on file     Inability: Not on file   ? Transportation needs     Medical: Not on file     Non-medical: Not on file   Tobacco Use   ? Smoking status: Never Smoker   ? Smokeless tobacco: Never Used   Substance and Sexual Activity   ? Alcohol use: Yes     Comment: rare   ? Drug  use: Never   ? Sexual activity: Not on file   Lifestyle   ? Physical activity     Days per week: Not on file     Minutes per session: Not on file   ? Stress: Not on file   Relationships   ? Social connections     Talks on phone: Not on file     Gets together: Not on file     Attends Bahai service: Not on file     Active member of club or organization: Not on file     Attends meetings of clubs or organizations: Not on file     Relationship status: Not on file   ? Intimate partner violence     Fear of current or ex partner: Not on file     Emotionally abused: Not on file     Physically abused: Not on file     Forced sexual activity: Not on file   Other Topics Concern   ? Not on file   Social History Narrative   ? Not on file          Medications  Allergies   Current Outpatient Medications   Medication Sig Dispense Refill   ? albuterol (PROAIR HFA;PROVENTIL HFA;VENTOLIN HFA) 90 mcg/actuation inhaler Inhale 1-2 puffs.     ? arginine HCl, L-arginine, 1,000 mg Tab Take 3,000 mg by mouth.     ? aspirin 81 MG EC tablet Take 81 mg by mouth.     ? fluticasone propion-salmeteroL (ADVAIR) 250-50 mcg/dose DISKUS Inhale 1 puff.     ? isosorbide mononitrate (IMDUR) 60 MG 24 hr tablet Take 1 tablet (60 mg total) by mouth daily. 90 tablet 3   ? nitroglycerin (NITROSTAT) 0.4 MG SL tablet Place 0.4 mg under the tongue.     ? olmesartan (BENICAR) 40 MG tablet Take 40 mg by mouth daily.      ? rosuvastatin (CRESTOR) 5 MG tablet Take 0.5 tablets (2.5 mg total) by mouth at bedtime. 90 tablet 3     No current facility-administered medications for this visit.       Allergies   Allergen Reactions   ? Aspirin Wheezing   ? Clindamycin Anaphylaxis   ? Penicillins Shortness Of Breath   ? Erythromycin Nausea And Vomiting   ? Iodine Rash         Lab Results    Chemistry/lipid CBC Cardiac Enzymes/BNP/TSH/INR   Lab Results   Component Value Date    CHOL 175 09/09/2020    HDL 55 09/09/2020    LDLCALC 107 09/09/2020    TRIG 65 09/09/2020     CREATININE 0.79 09/09/2020    BUN 9 09/09/2020    K 4.3 09/09/2020     09/09/2020     (H) 09/09/2020    CO2 28 09/09/2020    Lab Results   Component Value Date    WBC 4.3 09/09/2020    HGB 11.9 (L) 09/09/2020    HCT 35.5 09/09/2020    MCV 91 09/09/2020     09/09/2020    Lab Results   Component Value Date    TSH 1.10 06/09/2020

## 2021-06-29 NOTE — PROGRESS NOTES
Progress Notes by Mag Vanegas MD at 9/4/2020  3:30 PM     Author: Mag Vanegas MD Service: -- Author Type: Physician    Filed: 9/4/2020  6:35 PM Encounter Date: 9/4/2020 Status: Signed    : Mag Vanegas MD (Physician)           Thank you, Dr. Traore, for asking us to see Marta Jimenez at the [facility].      Assessment/Recommendations   Assessment:    1. Chest pain: recurrent chest pain relieved with SL NTG.  I discussed with her that could be secondary to her microvascular disease and would recommend adjusting her medications further.  Would also recommend considering a GI workup which has never been done.  She is anxious regarding her worsening symptoms especially as it is now affecting her work.   Will proceed with coronary angiography to exclude any new disease.    2. Microvascular heart disease diagnosed by an outside hospital      Plan:  1. Coronary angiogram with possible intervention  2. Increase imdur to 60 mg daily  3. Consider changing olmasartan to a calcium channel blocker.         History of Present Illness    Ms. Marta Jimenez is a 55 y.o. female with history of microvascular heart disease diagnosed in [place] in 2016 maintained on L arginine, olmasartan and aspirin.  She had COVID in April.  Over the past 2-3 months she has had severe symptoms of chest pain that she describes as a pressure.  She has been taking SL NTG 2-3 times a week.  Over the past couple weeks the symptoms have worsened and she went home from work due to the pain.  I started her on imdur 30 mg daily which did not help with her symptoms.  She had a stress MRI that was completely normal.  She is in tears today due to her symptoms.     Stress cardiac MR 8/20/20  IMPRESSIONS:      1.  Lexiscan stress cardiac MRI is negative for inducible myocardial ischemia.   2.  Lexiscan stress ECG is negative for inducible myocardial ischemia.  3.  No previous myocardial infarction  is identified.  4.  Normal left ventricular size, wall thickness and function. The calculated left ventricular ejection fraction is 75%  5.  Normal right ventricular size and function.  6.  No obvious valvular disease.       Physical Examination Review of Systems   Vitals:    09/04/20 1532   BP: 120/78   Pulse: 84   Resp: 12     Body mass index is 25.81 kg/m .  Wt Readings from Last 3 Encounters:   09/04/20 148 lb (67.1 kg)   08/20/20 154 lb (69.9 kg)   07/31/20 154 lb (69.9 kg)       General Appearance:   alert, no apparent distress   HEENT:  no scleral icterus; the mucous membranes are pink and moist                                  Neck: No jvd   Chest: the spine is straight and the chest is symmetric   Lungs:   respirations unlabored; the lungs are clear to auscultation   Cardiovascular:   regular rhythm with normal first and second heart sounds and no murmurs or gallops   Abdomen:  no organomegaly, masses, bruits, or tenderness; bowel sounds are present   Extremities: no cyanosis, clubbing, or edema   Skin: no xanthelasma    General: WNL  Eyes: WNL  Ears/Nose/Throat: WNL  Lungs: WNL  Heart: Chest Pain, Arm Pain, Irregular Heartbeat  Stomach: Heartburn, Nausea  Bladder: WNL  Muscle/Joints: Joint Pain  Skin: WNL  Nervous System: Dizziness  Mental Health: WNL     Blood: WNL     Medical History  Surgical History Family History Social History   Past Medical History:   Diagnosis Date   ? Asthma    ? Family history of myocardial infarction    ? Hypertension     No past surgical history on file. Family History   Problem Relation Age of Onset   ? Heart attack Father     Social History     Socioeconomic History   ? Marital status:      Spouse name: Not on file   ? Number of children: Not on file   ? Years of education: Not on file   ? Highest education level: Not on file   Occupational History   ? Not on file   Social Needs   ? Financial resource strain: Not on file   ? Food insecurity     Worry: Not on file      Inability: Not on file   ? Transportation needs     Medical: Not on file     Non-medical: Not on file   Tobacco Use   ? Smoking status: Never Smoker   ? Smokeless tobacco: Never Used   Substance and Sexual Activity   ? Alcohol use: Not on file   ? Drug use: Not on file   ? Sexual activity: Not on file   Lifestyle   ? Physical activity     Days per week: Not on file     Minutes per session: Not on file   ? Stress: Not on file   Relationships   ? Social connections     Talks on phone: Not on file     Gets together: Not on file     Attends Hindu service: Not on file     Active member of club or organization: Not on file     Attends meetings of clubs or organizations: Not on file     Relationship status: Not on file   ? Intimate partner violence     Fear of current or ex partner: Not on file     Emotionally abused: Not on file     Physically abused: Not on file     Forced sexual activity: Not on file   Other Topics Concern   ? Not on file   Social History Narrative   ? Not on file          Medications  Allergies   Current Outpatient Medications   Medication Sig Dispense Refill   ? albuterol (PROAIR HFA;PROVENTIL HFA;VENTOLIN HFA) 90 mcg/actuation inhaler Inhale 1-2 puffs.     ? arginine HCl, L-arginine, 1,000 mg Tab Take 3,000 mg by mouth.     ? aspirin 81 MG EC tablet Take 81 mg by mouth.     ? fluticasone propion-salmeteroL (ADVAIR) 250-50 mcg/dose DISKUS Inhale 1 puff.     ? isosorbide mononitrate (IMDUR) 60 MG 24 hr tablet Take 1 tablet (60 mg total) by mouth daily. 90 tablet 3   ? nitroglycerin (NITROSTAT) 0.4 MG SL tablet Place 0.4 mg under the tongue.     ? olmesartan (BENICAR) 40 MG tablet Take 40 mg by mouth.       No current facility-administered medications for this visit.       Allergies   Allergen Reactions   ? Aspirin Wheezing   ? Clindamycin Anaphylaxis   ? Penicillins Shortness Of Breath   ? Erythromycin Nausea And Vomiting   ? Iodine Rash         Lab Results    Chemistry/lipid CBC Cardiac  Enzymes/BNP/TSH/INR   Lab Results   Component Value Date    CREATININE 0.87 06/09/2020    BUN 9 06/09/2020    K 4.0 06/09/2020     06/09/2020     06/09/2020    CO2 26 06/09/2020    Lab Results   Component Value Date    WBC 6.1 06/09/2020    HGB 13.0 06/09/2020    HCT 38.7 06/09/2020    MCV 91 06/09/2020     06/09/2020    Lab Results   Component Value Date    TSH 1.10 06/09/2020

## 2021-09-23 ENCOUNTER — OFFICE VISIT (OUTPATIENT)
Dept: CARDIOLOGY | Facility: CLINIC | Age: 56
End: 2021-09-23
Payer: COMMERCIAL

## 2021-09-23 VITALS
DIASTOLIC BLOOD PRESSURE: 60 MMHG | HEART RATE: 86 BPM | SYSTOLIC BLOOD PRESSURE: 120 MMHG | BODY MASS INDEX: 28.52 KG/M2 | WEIGHT: 161 LBS

## 2021-09-23 DIAGNOSIS — E78.5 DYSLIPIDEMIA: Primary | ICD-10-CM

## 2021-09-23 DIAGNOSIS — I25.85 CARDIAC MICROVASCULAR DISEASE: ICD-10-CM

## 2021-09-23 PROCEDURE — 99214 OFFICE O/P EST MOD 30 MIN: CPT | Performed by: INTERNAL MEDICINE

## 2021-09-23 RX ORDER — VALACYCLOVIR HYDROCHLORIDE 1 G/1
500 TABLET, FILM COATED ORAL DAILY
COMMUNITY
Start: 2021-09-02

## 2021-09-23 RX ORDER — TESTOSTERONE CYPIONATE 200 MG/ML
40 INJECTION, SOLUTION INTRAMUSCULAR WEEKLY
COMMUNITY
Start: 2021-03-23

## 2021-09-23 RX ORDER — EZETIMIBE 10 MG/1
10 TABLET ORAL DAILY
Qty: 90 TABLET | Refills: 3 | Status: SHIPPED | OUTPATIENT
Start: 2021-09-23

## 2021-09-23 NOTE — LETTER
9/23/2021    Lorin Traore MD  [facility] [address]  [facility] [id number]    RE: Marta Jimenez       Dear Colleague,    I had the pleasure of seeing Marta Jimenez in the [facility].      HEART CARE ENCOUNTER CONSULTATON NOTE      [facility]  [phone]      Assessment/Recommendations   Assessment:    1.   Coronary artery disease: 60% stenotic lesion in very small ramus   2.   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  Preoperative evaluation prior to bilateral mastectomy        Plan:  1.     Start Zetia 10 mg daily for better control of lipids.  Statin intolerant.  Repeat fasting lipids in a couple months  2.   Continue imdur 60 mg daily  3.     May proceed with surgery as scheduled.  No cardiac contraindications.  No anginal complaints today and EKG shows no change compared to prior.  Follow-up in 1 year     History of Present Illness/Subjective    HPI: Marta Jimenez is a 56 year old adult with history of microvascular disease diagnosed in [place] in 2016 who I saw last year due to worsening symptoms after he developed Covid in April 2020.  Stress MRI was normal however due to problems with progressive chest discomfort decided to proceed with core angiography to exclude any new disease which demonstrated 60% stenotic lesion in a very small ramus artery.  I am seeing him today for preoperative evaluation prior to bilateral mastectomy.  Currently doing well on medical therapy.  He had one episode of chest discomfort after he received his second Covid vaccine.  Denies any symptoms with exertion.  He is fairly active.  He walks, rides a bike, lifts weights, paddle boards.  Denies chest pain or breathing difficulty.  He stopped Crestor on his own due to headaches.    9/20/2021 twelve-lead EKG personally reviewed shows normal sinus rhythm at 70 bpm, possible anteroseptal MI no  change compared to 9/9/2020    Coronary angiogram 9/9/2020  Conclusion      Ramus lesion is 60% stenosed. Very small artery. Ostial.            Physical Examination  Review of Systems   Vitals: /60 (BP Location: Left arm, Patient Position: Chair, Cuff Size: Adult Regular)   Pulse 86   Wt 73 kg (161 lb)   BMI 28.52 kg/m    BMI= Body mass index is 28.52 kg/m .  Wt Readings from Last 3 Encounters:   09/23/21 73 kg (161 lb)   09/21/20 67.1 kg (148 lb)   09/09/20 68.6 kg (151 lb 3.2 oz)       General Appearance:   no distress, normal body habitus   ENT/Mouth: membranes moist, no oral lesions or bleeding gums.      EYES:  no scleral icterus, normal conjunctivae   Neck: no carotid bruits or thyromegaly   Chest/Lungs:   lungs are clear to auscultation   Cardiovascular:   Regular. Normal first and second heart sounds with no murmurs no edema bilaterally    Abdomen:  no organomegaly, masses, bruits, or tenderness; bowel sounds are present   Extremities: no cyanosis or clubbing   Skin: no xanthelasma, warm.    Neurologic: normal  bilateral, no tremors     Psychiatric: alert and oriented x3, calm     Cardiac ROS  Eyes: Negative  Ears/Nose/Throat: Negative  Respiratory: Negative  Gastrointestinal: Negative  Genitourinary: Negative  Musculoskeletal: Negative  Neurologic: Negative  Psychiatric: Negative  Hematologic/Lymphatic/Immunologic: Negative  Endocrine: Negative  Please refer above for cardiac ROS details.        Medical History  Surgical History Family History Social History   Past Medical History:   Diagnosis Date     Asthma      Family history of myocardial infarction      Hypertension      Past Surgical History:   Procedure Laterality Date     CORONARY ANGIOPLASTY  2016     CV CORONARY ANGIOGRAM N/A 9/9/2020    Procedure: Coronary Angiogram;  Surgeon: Juan Manuel Thompson MD;  Location: [facility];  Service: Cardiology     HIP ARTHROSCOPY W/ LABRAL REPAIR  2016    left     HYSTERECTOMY  2006      MYOMECTOMY  1993     TONSILLECTOMY & ADENOIDECTOMY  1972     Family History   Problem Relation Age of Onset     Coronary Artery Disease Father      No Known Problems Mother         Social History     Socioeconomic History     Marital status:      Spouse name: Not on file     Number of children: Not on file     Years of education: Not on file     Highest education level: Not on file   Occupational History     Not on file   Tobacco Use     Smoking status: Never Smoker     Smokeless tobacco: Never Used   Substance and Sexual Activity     Alcohol use: Yes     Comment: Alcoholic Drinks/day: rare     Drug use: Never     Sexual activity: Not on file   Other Topics Concern     Not on file   Social History Narrative     Not on file     Social Determinants of Health     Financial Resource Strain:      Difficulty of Paying Living Expenses:    Food Insecurity:      Worried About Running Out of Food in the Last Year:      Ran Out of Food in the Last Year:    Transportation Needs:      Lack of Transportation (Medical):      Lack of Transportation (Non-Medical):    Physical Activity:      Days of Exercise per Week:      Minutes of Exercise per Session:    Stress:      Feeling of Stress :    Social Connections:      Frequency of Communication with Friends and Family:      Frequency of Social Gatherings with Friends and Family:      Attends Judaism Services:      Active Member of Clubs or Organizations:      Attends Club or Organization Meetings:      Marital Status:    Intimate Partner Violence:      Fear of Current or Ex-Partner:      Emotionally Abused:      Physically Abused:      Sexually Abused:            Medications  Allergies   Current Outpatient Medications   Medication Sig Dispense Refill     albuterol (PROAIR HFA;PROVENTIL HFA;VENTOLIN HFA) 90 mcg/actuation inhaler [ALBUTEROL (PROAIR HFA;PROVENTIL HFA;VENTOLIN HFA) 90 MCG/ACTUATION INHALER] Inhale 1-2 puffs.       arginine HCl, L-arginine, 1,000 mg Tab Take  3,000 mg by mouth daily        aspirin 81 MG EC tablet [ASPIRIN 81 MG EC TABLET] Take 81 mg by mouth.       ezetimibe (ZETIA) 10 MG tablet Take 1 tablet (10 mg) by mouth daily 90 tablet 3     fluticasone propion-salmeteroL (ADVAIR) 250-50 mcg/dose DISKUS Inhale 1 puff into the lungs 2 times daily        isosorbide mononitrate (IMDUR) 60 MG 24 hr tablet TAKE 1 TABLET BY MOUTH EVERY DAY 30 tablet 0     nitroglycerin (NITROSTAT) 0.4 MG SL tablet [NITROGLYCERIN (NITROSTAT) 0.4 MG SL TABLET] Place 0.4 mg under the tongue.       olmesartan (BENICAR) 40 MG tablet [OLMESARTAN (BENICAR) 40 MG TABLET] Take 40 mg by mouth daily.        rosuvastatin (CRESTOR) 5 MG tablet [ROSUVASTATIN (CRESTOR) 5 MG TABLET] Take 0.5 tablets (2.5 mg total) by mouth at bedtime. 90 tablet 3     testosterone cypionate (DEPOTESTOSTERONE) 200 MG/ML injection Inject 40 mg into the muscle once a week       valACYclovir (VALTREX) 1000 mg tablet Take 500 mg by mouth daily          Allergies   Allergen Reactions     Aspirin Difficulty breathing     Clindamycin Anaphylaxis     Penicillins Shortness Of Breath     Erythromycin Nausea and Vomiting     Iodine Rash          Lab Results    Chemistry/lipid CBC Cardiac Enzymes/BNP/TSH/INR   Recent Labs   Lab Test 09/09/20  1038   CHOL 175   HDL 55      TRIG 65     Recent Labs   Lab Test 09/09/20  1038        Recent Labs   Lab Test 11/27/20  1701      POTASSIUM 4.8   CHLORIDE 106   CO2 29   GLC 91   BUN 9   CR 0.82   GFRESTIMATED >60   KORTNEY 9.7     Recent Labs   Lab Test 11/27/20  1701 10/09/20  1136 09/09/20  1038   CR 0.82 0.83 0.79     Recent Labs   Lab Test 11/01/19  1614   A1C 5.8          Recent Labs   Lab Test 11/27/20  1701   WBC 7.7   HGB 13.0   HCT 40.7   MCV 93        Recent Labs   Lab Test 11/27/20  1701 10/09/20  1136 09/09/20  1038   HGB 13.0 13.2 11.9*    No results for input(s): TROPONINI in the last 81696 hours.  No results for input(s): BNP, NTBNPI, NTBNP in the last  30608 hours.  Recent Labs   Lab Test 06/09/20  1003   TSH 1.10     No results for input(s): INR in the last 11033 hours.     Mag Vanegas MD                                          Thank you for allowing me to participate in the care of your patient.      Sincerely,     Mag Vanegas MD     [facility]  cc:   [place] [address]

## 2021-09-28 NOTE — PROGRESS NOTES
HEART CARE ENCOUNTER CONSULTATON NOTE      [facility]  [phone]      Assessment/Recommendations   Assessment:    1.   Coronary artery disease: 60% stenotic lesion in very small ramus   2.   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  Preoperative evaluation prior to bilateral mastectomy        Plan:  1.     Start Zetia 10 mg daily for better control of lipids.  Statin intolerant.  Repeat fasting lipids in a couple months  2.   Continue imdur 60 mg daily  3.     May proceed with surgery as scheduled.  No cardiac contraindications.  No anginal complaints today and EKG shows no change compared to prior.  Follow-up in 1 year     History of Present Illness/Subjective    HPI: Marta Jimenez is a 56 year old adult with history of microvascular disease diagnosed in [place] in 2016 who I saw last year due to worsening symptoms after he developed Covid in April 2020.  Stress MRI was normal however due to problems with progressive chest discomfort decided to proceed with core angiography to exclude any new disease which demonstrated 60% stenotic lesion in a very small ramus artery.  I am seeing him today for preoperative evaluation prior to bilateral mastectomy.  Currently doing well on medical therapy.  He had one episode of chest discomfort after he received his second Covid vaccine.  Denies any symptoms with exertion.  He is fairly active.  He walks, rides a bike, lifts weights, paddle boards.  Denies chest pain or breathing difficulty.  He stopped Crestor on his own due to headaches.    9/20/2021 twelve-lead EKG personally reviewed shows normal sinus rhythm at 70 bpm, possible anteroseptal MI no change compared to 9/9/2020    Coronary angiogram 9/9/2020  Conclusion      Ramus lesion is 60% stenosed. Very small artery. Ostial.            Physical Examination  Review of Systems   Vitals: /60 (BP Location: Left arm, Patient Position: Chair, Cuff Size: Adult Regular)    Pulse 86   Wt 73 kg (161 lb)   BMI 28.52 kg/m    BMI= Body mass index is 28.52 kg/m .  Wt Readings from Last 3 Encounters:   09/23/21 73 kg (161 lb)   09/21/20 67.1 kg (148 lb)   09/09/20 68.6 kg (151 lb 3.2 oz)       General Appearance:   no distress, normal body habitus   ENT/Mouth: membranes moist, no oral lesions or bleeding gums.      EYES:  no scleral icterus, normal conjunctivae   Neck: no carotid bruits or thyromegaly   Chest/Lungs:   lungs are clear to auscultation   Cardiovascular:   Regular. Normal first and second heart sounds with no murmurs no edema bilaterally    Abdomen:  no organomegaly, masses, bruits, or tenderness; bowel sounds are present   Extremities: no cyanosis or clubbing   Skin: no xanthelasma, warm.    Neurologic: normal  bilateral, no tremors     Psychiatric: alert and oriented x3, calm     Cardiac ROS  Eyes: Negative  Ears/Nose/Throat: Negative  Respiratory: Negative  Gastrointestinal: Negative  Genitourinary: Negative  Musculoskeletal: Negative  Neurologic: Negative  Psychiatric: Negative  Hematologic/Lymphatic/Immunologic: Negative  Endocrine: Negative  Please refer above for cardiac ROS details.        Medical History  Surgical History Family History Social History   Past Medical History:   Diagnosis Date     Asthma      Family history of myocardial infarction      Hypertension      Past Surgical History:   Procedure Laterality Date     CORONARY ANGIOPLASTY  2016     CV CORONARY ANGIOGRAM N/A 9/9/2020    Procedure: Coronary Angiogram;  Surgeon: Juan Manuel Thompson MD;  Location: [facility] Lab;  Service: Cardiology     HIP ARTHROSCOPY W/ LABRAL REPAIR  2016    left     HYSTERECTOMY  2006     MYOMECTOMY  1993     TONSILLECTOMY & ADENOIDECTOMY  1972     Family History   Problem Relation Age of Onset     Coronary Artery Disease Father      No Known Problems Mother         Social History     Socioeconomic History     Marital status:      Spouse name: Not on file      Number of children: Not on file     Years of education: Not on file     Highest education level: Not on file   Occupational History     Not on file   Tobacco Use     Smoking status: Never Smoker     Smokeless tobacco: Never Used   Substance and Sexual Activity     Alcohol use: Yes     Comment: Alcoholic Drinks/day: rare     Drug use: Never     Sexual activity: Not on file   Other Topics Concern     Not on file   Social History Narrative     Not on file     Social Determinants of Health     Financial Resource Strain:      Difficulty of Paying Living Expenses:    Food Insecurity:      Worried About Running Out of Food in the Last Year:      Ran Out of Food in the Last Year:    Transportation Needs:      Lack of Transportation (Medical):      Lack of Transportation (Non-Medical):    Physical Activity:      Days of Exercise per Week:      Minutes of Exercise per Session:    Stress:      Feeling of Stress :    Social Connections:      Frequency of Communication with Friends and Family:      Frequency of Social Gatherings with Friends and Family:      Attends Jain Services:      Active Member of Clubs or Organizations:      Attends Club or Organization Meetings:      Marital Status:    Intimate Partner Violence:      Fear of Current or Ex-Partner:      Emotionally Abused:      Physically Abused:      Sexually Abused:            Medications  Allergies   Current Outpatient Medications   Medication Sig Dispense Refill     albuterol (PROAIR HFA;PROVENTIL HFA;VENTOLIN HFA) 90 mcg/actuation inhaler [ALBUTEROL (PROAIR HFA;PROVENTIL HFA;VENTOLIN HFA) 90 MCG/ACTUATION INHALER] Inhale 1-2 puffs.       arginine HCl, L-arginine, 1,000 mg Tab Take 3,000 mg by mouth daily        aspirin 81 MG EC tablet [ASPIRIN 81 MG EC TABLET] Take 81 mg by mouth.       ezetimibe (ZETIA) 10 MG tablet Take 1 tablet (10 mg) by mouth daily 90 tablet 3     fluticasone propion-salmeteroL (ADVAIR) 250-50 mcg/dose DISKUS Inhale 1 puff into the lungs 2  times daily        isosorbide mononitrate (IMDUR) 60 MG 24 hr tablet TAKE 1 TABLET BY MOUTH EVERY DAY 30 tablet 0     nitroglycerin (NITROSTAT) 0.4 MG SL tablet [NITROGLYCERIN (NITROSTAT) 0.4 MG SL TABLET] Place 0.4 mg under the tongue.       olmesartan (BENICAR) 40 MG tablet [OLMESARTAN (BENICAR) 40 MG TABLET] Take 40 mg by mouth daily.        rosuvastatin (CRESTOR) 5 MG tablet [ROSUVASTATIN (CRESTOR) 5 MG TABLET] Take 0.5 tablets (2.5 mg total) by mouth at bedtime. 90 tablet 3     testosterone cypionate (DEPOTESTOSTERONE) 200 MG/ML injection Inject 40 mg into the muscle once a week       valACYclovir (VALTREX) 1000 mg tablet Take 500 mg by mouth daily          Allergies   Allergen Reactions     Aspirin Difficulty breathing     Clindamycin Anaphylaxis     Penicillins Shortness Of Breath     Erythromycin Nausea and Vomiting     Iodine Rash          Lab Results    Chemistry/lipid CBC Cardiac Enzymes/BNP/TSH/INR   Recent Labs   Lab Test 09/09/20  1038   CHOL 175   HDL 55      TRIG 65     Recent Labs   Lab Test 09/09/20  1038        Recent Labs   Lab Test 11/27/20  1701      POTASSIUM 4.8   CHLORIDE 106   CO2 29   GLC 91   BUN 9   CR 0.82   GFRESTIMATED >60   KORTNEY 9.7     Recent Labs   Lab Test 11/27/20  1701 10/09/20  1136 09/09/20  1038   CR 0.82 0.83 0.79     Recent Labs   Lab Test 11/01/19  1614   A1C 5.8          Recent Labs   Lab Test 11/27/20  1701   WBC 7.7   HGB 13.0   HCT 40.7   MCV 93        Recent Labs   Lab Test 11/27/20  1701 10/09/20  1136 09/09/20  1038   HGB 13.0 13.2 11.9*    No results for input(s): TROPONINI in the last 66327 hours.  No results for input(s): BNP, NTBNPI, NTBNP in the last 04425 hours.  Recent Labs   Lab Test 06/09/20  1003   TSH 1.10     No results for input(s): INR in the last 76650 hours.     Mag Vanegas MD

## 2021-10-10 ENCOUNTER — HEALTH MAINTENANCE LETTER (OUTPATIENT)
Age: 56
End: 2021-10-10

## 2022-07-17 ENCOUNTER — HEALTH MAINTENANCE LETTER (OUTPATIENT)
Age: 57
End: 2022-07-17

## 2022-09-24 ENCOUNTER — HEALTH MAINTENANCE LETTER (OUTPATIENT)
Age: 57
End: 2022-09-24

## 2022-12-20 DIAGNOSIS — I20.89 SYNDROME X (CARDIAC) (H): ICD-10-CM

## 2022-12-20 DIAGNOSIS — E78.5 DYSLIPIDEMIA: Primary | ICD-10-CM

## 2023-08-05 ENCOUNTER — HEALTH MAINTENANCE LETTER (OUTPATIENT)
Age: 58
End: 2023-08-05

## 2024-09-22 ENCOUNTER — HEALTH MAINTENANCE LETTER (OUTPATIENT)
Age: 59
End: 2024-09-22